# Patient Record
Sex: FEMALE | Race: WHITE | NOT HISPANIC OR LATINO | Employment: FULL TIME | URBAN - METROPOLITAN AREA
[De-identification: names, ages, dates, MRNs, and addresses within clinical notes are randomized per-mention and may not be internally consistent; named-entity substitution may affect disease eponyms.]

---

## 2017-02-07 ENCOUNTER — HISTORICAL (OUTPATIENT)
Dept: LAB | Facility: HOSPITAL | Age: 32
End: 2017-02-07

## 2017-08-05 LAB — RAPID GROUP A STREP (OHS): NEGATIVE

## 2021-10-14 ENCOUNTER — HISTORICAL (OUTPATIENT)
Dept: ADMINISTRATIVE | Facility: HOSPITAL | Age: 36
End: 2021-10-14

## 2021-10-18 LAB — FINAL CULTURE: NORMAL

## 2022-04-10 ENCOUNTER — HISTORICAL (OUTPATIENT)
Dept: ADMINISTRATIVE | Facility: HOSPITAL | Age: 37
End: 2022-04-10

## 2022-04-26 VITALS
WEIGHT: 289.25 LBS | SYSTOLIC BLOOD PRESSURE: 118 MMHG | DIASTOLIC BLOOD PRESSURE: 66 MMHG | HEIGHT: 71 IN | BODY MASS INDEX: 40.49 KG/M2 | OXYGEN SATURATION: 97 %

## 2022-05-04 PROCEDURE — 87070 CULTURE OTHR SPECIMN AEROBIC: CPT | Performed by: DERMATOLOGY

## 2022-05-05 ENCOUNTER — LAB REQUISITION (OUTPATIENT)
Dept: LAB | Facility: HOSPITAL | Age: 37
End: 2022-05-05
Payer: COMMERCIAL

## 2022-05-05 DIAGNOSIS — L73.8 OTHER SPECIFIED FOLLICULAR DISORDERS: ICD-10-CM

## 2022-05-09 LAB — BACTERIA SPEC CULT: ABNORMAL

## 2022-06-02 ENCOUNTER — LAB REQUISITION (OUTPATIENT)
Dept: LAB | Facility: HOSPITAL | Age: 37
End: 2022-06-02
Payer: COMMERCIAL

## 2022-06-02 DIAGNOSIS — L73.8 OTHER SPECIFIED FOLLICULAR DISORDERS: ICD-10-CM

## 2022-06-02 PROCEDURE — 87070 CULTURE OTHR SPECIMN AEROBIC: CPT | Performed by: DERMATOLOGY

## 2022-06-05 LAB — BACTERIA SPEC CULT: NORMAL

## 2022-07-25 PROBLEM — R25.2 SPASM: Status: ACTIVE | Noted: 2022-07-25

## 2022-07-25 PROBLEM — Z00.00 WELLNESS EXAMINATION: Status: ACTIVE | Noted: 2022-07-25

## 2022-07-25 PROBLEM — J30.9 ALLERGIC RHINITIS: Status: ACTIVE | Noted: 2022-07-25

## 2022-07-25 PROBLEM — E55.9 VITAMIN D DEFICIENCY: Status: ACTIVE | Noted: 2022-07-25

## 2022-07-25 PROBLEM — F41.8 MIXED ANXIETY DEPRESSIVE DISORDER: Status: ACTIVE | Noted: 2022-07-25

## 2022-07-25 PROBLEM — E03.9 HYPOTHYROIDISM: Status: ACTIVE | Noted: 2022-07-25

## 2022-07-25 PROBLEM — I38 HEART VALVE DISEASE: Status: ACTIVE | Noted: 2022-07-25

## 2022-07-25 PROBLEM — E34.9 DISORDER OF ENDOCRINE SYSTEM: Status: ACTIVE | Noted: 2022-07-25

## 2022-08-04 PROBLEM — Z80.0 FAMILY HISTORY OF COLON CANCER: Status: ACTIVE | Noted: 2021-10-27

## 2022-09-21 ENCOUNTER — HISTORICAL (OUTPATIENT)
Dept: ADMINISTRATIVE | Facility: HOSPITAL | Age: 37
End: 2022-09-21
Payer: COMMERCIAL

## 2022-10-24 PROBLEM — Z00.00 WELLNESS EXAMINATION: Status: RESOLVED | Noted: 2022-07-25 | Resolved: 2022-10-24

## 2023-02-08 PROBLEM — E66.01 MORBID OBESITY: Status: ACTIVE | Noted: 2023-02-08

## 2023-02-08 PROBLEM — E11.65 TYPE 2 DIABETES MELLITUS WITH HYPERGLYCEMIA, WITHOUT LONG-TERM CURRENT USE OF INSULIN: Status: ACTIVE | Noted: 2023-02-08

## 2023-03-28 ENCOUNTER — OFFICE VISIT (OUTPATIENT)
Dept: NEUROLOGY | Facility: CLINIC | Age: 38
End: 2023-03-28
Payer: COMMERCIAL

## 2023-03-28 VITALS
BODY MASS INDEX: 39.2 KG/M2 | SYSTOLIC BLOOD PRESSURE: 122 MMHG | WEIGHT: 280 LBS | HEIGHT: 71 IN | DIASTOLIC BLOOD PRESSURE: 70 MMHG

## 2023-03-28 DIAGNOSIS — G47.8 NON-RESTORATIVE SLEEP: ICD-10-CM

## 2023-03-28 DIAGNOSIS — G47.19 EXCESSIVE DAYTIME SLEEPINESS: ICD-10-CM

## 2023-03-28 DIAGNOSIS — G47.33 OSA ON CPAP: ICD-10-CM

## 2023-03-28 PROCEDURE — 3044F HG A1C LEVEL LT 7.0%: CPT | Mod: CPTII,S$GLB,, | Performed by: SPECIALIST

## 2023-03-28 PROCEDURE — 99999 PR PBB SHADOW E&M-EST. PATIENT-LVL III: CPT | Mod: PBBFAC,,, | Performed by: SPECIALIST

## 2023-03-28 PROCEDURE — 3078F PR MOST RECENT DIASTOLIC BLOOD PRESSURE < 80 MM HG: ICD-10-PCS | Mod: CPTII,S$GLB,, | Performed by: SPECIALIST

## 2023-03-28 PROCEDURE — 99999 PR PBB SHADOW E&M-EST. PATIENT-LVL III: ICD-10-PCS | Mod: PBBFAC,,, | Performed by: SPECIALIST

## 2023-03-28 PROCEDURE — 3008F PR BODY MASS INDEX (BMI) DOCUMENTED: ICD-10-PCS | Mod: CPTII,S$GLB,, | Performed by: SPECIALIST

## 2023-03-28 PROCEDURE — 3078F DIAST BP <80 MM HG: CPT | Mod: CPTII,S$GLB,, | Performed by: SPECIALIST

## 2023-03-28 PROCEDURE — 1159F PR MEDICATION LIST DOCUMENTED IN MEDICAL RECORD: ICD-10-PCS | Mod: CPTII,S$GLB,, | Performed by: SPECIALIST

## 2023-03-28 PROCEDURE — 3008F BODY MASS INDEX DOCD: CPT | Mod: CPTII,S$GLB,, | Performed by: SPECIALIST

## 2023-03-28 PROCEDURE — 99203 OFFICE O/P NEW LOW 30 MIN: CPT | Mod: S$GLB,,, | Performed by: SPECIALIST

## 2023-03-28 PROCEDURE — 3074F SYST BP LT 130 MM HG: CPT | Mod: CPTII,S$GLB,, | Performed by: SPECIALIST

## 2023-03-28 PROCEDURE — 1159F MED LIST DOCD IN RCRD: CPT | Mod: CPTII,S$GLB,, | Performed by: SPECIALIST

## 2023-03-28 PROCEDURE — 3044F PR MOST RECENT HEMOGLOBIN A1C LEVEL <7.0%: ICD-10-PCS | Mod: CPTII,S$GLB,, | Performed by: SPECIALIST

## 2023-03-28 PROCEDURE — 3074F PR MOST RECENT SYSTOLIC BLOOD PRESSURE < 130 MM HG: ICD-10-PCS | Mod: CPTII,S$GLB,, | Performed by: SPECIALIST

## 2023-03-28 PROCEDURE — 99203 PR OFFICE/OUTPT VISIT, NEW, LEVL III, 30-44 MIN: ICD-10-PCS | Mod: S$GLB,,, | Performed by: SPECIALIST

## 2023-03-28 NOTE — PROGRESS NOTES
Subjective:       Patient ID: Tamar Lira is a 37 y.o. female.    Chief Complaint: snoring; sleep apnea evaluation; other____    HPI:            Establish Care (CPAP every night just received 2 weeks ago. Average sleep 7 hours. Occasional daytime fatigue. Sleep study 02/16/23 AHI 19.7. Feels more refreshed upon awakening. )  Reviewed her prior study with her     notes may also be on facesheet for HPI, ROS, and other sections     Review of Systems  Hard copy ROS reviewed     EPWORTH SLEEPINESS SCALE 3/28/2023   Sitting and reading 3   Watching TV 1   Sitting, inactive in a public place (e.g. a theatre or a meeting) 2   As a passenger in a car for an hour without a break 2   Lying down to rest in the afternoon when circumstances permit 2   Sitting and talking to someone 0   Sitting quietly after a lunch without alcohol 1   In a car, while stopped for a few minutes in traffic 0   Total score 11           Social History     Socioeconomic History    Marital status:    Tobacco Use    Smoking status: Never    Smokeless tobacco: Never   Substance and Sexual Activity    Alcohol use: Never    Drug use: Never     ----------------------------  Hypothyroidism, unspecified    Current Outpatient Medications   Medication Instructions    ALPRAZolam (XANAX) 0.25 mg, Oral, 3 times daily    busPIRone (BUSPAR) 15 MG tablet TAKE 1 TABLET BY MOUTH THREE TIMES A DAY    esomeprazole (NEXIUM) 20 MG capsule TAKE ONE CAPSULE BY MOUTH EVERY DAY    famotidine (PEPCID) 40 MG tablet TAKE 1 TABLET BY MOUTH TWICE A DAY    hydroCHLOROthiazide (HYDRODIURIL) 50 mg, Oral, Daily    ketoconazole (NIZORAL) 2 % shampoo No dose, route, or frequency recorded.    liothyronine (CYTOMEL) 5 MCG Tab TAKE 1 TABLET BY MOUTH ONCE DAILY.    metoprolol succinate (TOPROL-XL) 25 mg, Oral    phentermine (ADIPEX-P) 37.5 mg, Oral    sertraline (ZOLOFT) 100 MG tablet TAKE 1 TABLET BY MOUTH EVERY DAY    terbinafine HCL (LAMISIL) 250 mg, Oral, Daily    thyroid, pork,  "(ARMOUR THYROID) 60 mg Tab TAKE 2 & 1/2 TABLETS ONCE DAILY ON EMPTY STOMACH         Objective:      Exam:   /70   Ht 5' 10.8" (1.798 m)   Wt 127 kg (280 lb)   BMI 39.27 kg/m²       General Exam  if accompanied, by__  mental status_alert and appropriate  Oropharynx Mallampati grade_ 3  body habitus_ Body mass index is 39.27 kg/m².   Heart_ RRR  Extremities_ no edema   skin_  Neurological  Speech __ clear   cranial nerves:  CN 2 VF_  CN 7_no lower face asymmetry  CN 12 tongue_ok  Motor__ ok   Gait: unassisted    Labs:  __  Lengthy discussion about the risks of untreated moderate to severe obstructive sleep apnea (CANDIE).   Testing modalities/test options for sleep apnea discussed.  Potential need for treatment of CANDIE discussed including CPAP or Bilevel  PAP.   Alternatives to PAP discussed if PAP not ultimately tolerated.  Risks of drowsy driving discussed.  Weight loss discussed if patient is overweight.          Assessment/Plan:       Problem List Items Addressed This Visit          Other    CANDIE on CPAP     Other Visit Diagnoses       Excessive daytime sleepiness        Non-restorative sleep            Given that her study done prior to coming here and her pap ordered prior to coming here and she's tolerating pap and feeling well, I don't see a need for her to follow w us at all    she can follow w her 1' MD     She's welcome to call to return here if she or her 1' has something that I'm missing here                   Guilherme Ambriz MD                "